# Patient Record
Sex: MALE | Race: WHITE | NOT HISPANIC OR LATINO | URBAN - METROPOLITAN AREA
[De-identification: names, ages, dates, MRNs, and addresses within clinical notes are randomized per-mention and may not be internally consistent; named-entity substitution may affect disease eponyms.]

---

## 2020-09-22 ENCOUNTER — OFFICE VISIT (OUTPATIENT)
Dept: URBAN - METROPOLITAN AREA CLINIC 121 | Facility: CLINIC | Age: 75
End: 2020-09-22

## 2021-04-23 ENCOUNTER — OFFICE VISIT (OUTPATIENT)
Dept: URBAN - METROPOLITAN AREA CLINIC 63 | Facility: CLINIC | Age: 76
End: 2021-04-23

## 2021-05-05 ENCOUNTER — OFFICE VISIT (OUTPATIENT)
Dept: URBAN - METROPOLITAN AREA CLINIC 63 | Facility: CLINIC | Age: 76
End: 2021-05-05

## 2021-07-01 ENCOUNTER — OFFICE VISIT (OUTPATIENT)
Dept: URBAN - METROPOLITAN AREA CLINIC 63 | Facility: CLINIC | Age: 76
End: 2021-07-01

## 2021-07-31 ENCOUNTER — LAB OUTSIDE AN ENCOUNTER (OUTPATIENT)
Dept: URBAN - METROPOLITAN AREA CLINIC 121 | Facility: CLINIC | Age: 76
End: 2021-07-31

## 2021-08-12 ENCOUNTER — OFFICE VISIT (OUTPATIENT)
Dept: URBAN - METROPOLITAN AREA CLINIC 63 | Facility: CLINIC | Age: 76
End: 2021-08-12

## 2021-08-19 ENCOUNTER — OFFICE VISIT (OUTPATIENT)
Dept: URBAN - METROPOLITAN AREA CLINIC 63 | Facility: CLINIC | Age: 76
End: 2021-08-19

## 2021-08-21 LAB
BANDS: (no result)
BASO (ABSOLUTE): (no result)
BASOS: (no result)
BLASTS/BLAST LIKE CELLS: (no result)
EOS (ABSOLUTE): (no result)
EOS: (no result)
FERRITIN, SERUM: (no result)
FOLATE (FOLIC ACID), SERUM: (no result)
HEMATOCRIT: (no result)
HEMATOLOGY COMMENTS:: (no result)
HEMOGLOBIN: (no result)
IMMATURE CELLS: (no result)
IMMATURE GRANS (ABS): (no result)
IMMATURE GRANULOCYTES: (no result)
IRON BIND.CAP.(TIBC): (no result)
IRON SATURATION: (no result)
IRON: (no result)
LYMPHS (ABSOLUTE): (no result)
LYMPHS: (no result)
MCH: (no result)
MCHC: (no result)
MCV: (no result)
MEGAKARYOCYTES: (no result)
METAMYELOCYTES: (no result)
MONOCYTES(ABSOLUTE): (no result)
MONOCYTES: (no result)
MYELOCYTES: (no result)
NEUTROPHILS (ABSOLUTE): (no result)
NEUTROPHILS: (no result)
NRBC: (no result)
OTHER, LINEAGE UNCERTAIN: (no result)
PLATELETS: (no result)
PROMYELOCYTES: (no result)
RBC: (no result)
RDW: (no result)
UIBC: (no result)
VITAMIN B12: (no result)
WBC: (no result)

## 2021-08-25 ENCOUNTER — OFFICE VISIT (OUTPATIENT)
Dept: URBAN - METROPOLITAN AREA CLINIC 63 | Facility: CLINIC | Age: 76
End: 2021-08-25

## 2022-07-09 ENCOUNTER — TELEPHONE ENCOUNTER (OUTPATIENT)
Dept: URBAN - METROPOLITAN AREA CLINIC 121 | Facility: CLINIC | Age: 77
End: 2022-07-09

## 2022-07-09 RX ORDER — DOXAZOSIN MESYLATE 4 MG/1
TABLET ORAL
Refills: 0 | OUTPATIENT
Start: 2021-04-12 | End: 2021-05-05

## 2022-07-09 RX ORDER — ROSUVASTATIN CALCIUM 20 MG/1
TABLET, FILM COATED ORAL
Refills: 0 | OUTPATIENT
Start: 2021-03-03 | End: 2021-05-05

## 2022-07-09 RX ORDER — HYDRALAZINE HYDROCHLORIDE 50 MG/1
TABLET ORAL
Refills: 0 | OUTPATIENT
Start: 2021-04-17 | End: 2021-05-05

## 2022-07-09 RX ORDER — FERROUS SULFATE 325(65) MG
TABLET ORAL TWICE A DAY
Refills: 0 | OUTPATIENT
Start: 2021-05-05 | End: 2021-08-25

## 2022-07-09 RX ORDER — ONDANSETRON 4 MG/1
TABLET, ORALLY DISINTEGRATING ORAL AS NEEDED
Refills: 0 | OUTPATIENT
Start: 2021-05-05 | End: 2021-08-25

## 2022-07-09 RX ORDER — DILTIAZEM HYDROCHLORIDE 180 MG/1
CAPSULE, EXTENDED RELEASE ORAL
Refills: 0 | OUTPATIENT
Start: 2021-03-09 | End: 2021-05-05

## 2022-07-09 RX ORDER — ALBUTEROL SULFATE 90 UG/1
INHALANT RESPIRATORY (INHALATION)
Refills: 0 | OUTPATIENT
Start: 2021-04-18 | End: 2021-05-05

## 2022-07-09 RX ORDER — INSULIN DETEMIR 100 [IU]/ML
INJECTION, SOLUTION SUBCUTANEOUS
Refills: 0 | OUTPATIENT
Start: 2021-03-03 | End: 2021-05-05

## 2022-07-09 RX ORDER — MOMETASONE 50 UG/1
SPRAY, METERED NASAL
Refills: 0 | OUTPATIENT
Start: 2021-02-11 | End: 2021-05-05

## 2022-07-09 RX ORDER — TORSEMIDE 10 MG/1
TABLET ORAL
Refills: 0 | OUTPATIENT
Start: 2021-04-12 | End: 2021-05-05

## 2022-07-09 RX ORDER — HYDRALAZINE HYDROCHLORIDE 10 MG/1
75 MG TABLET ORAL THREE TIMES A DAY
Refills: 0 | OUTPATIENT
Start: 2021-05-05 | End: 2021-08-25

## 2022-07-09 RX ORDER — AMLODIPINE BESYLATE 10 MG/1
TABLET ORAL
Refills: 0 | OUTPATIENT
Start: 2021-04-09 | End: 2021-05-05

## 2022-07-09 RX ORDER — LISINOPRIL 40 MG/1
TABLET ORAL
Refills: 0 | OUTPATIENT
Start: 2021-03-03 | End: 2021-05-05

## 2022-07-09 RX ORDER — INSULIN ASPART 100 [IU]/ML
INJECTION, SOLUTION INTRAVENOUS; SUBCUTANEOUS
Refills: 0 | OUTPATIENT
Start: 2021-03-19 | End: 2021-05-05

## 2022-07-09 RX ORDER — AMLODIPINE BESYLATE 5 MG/1
TABLET ORAL
Refills: 0 | OUTPATIENT
Start: 2021-03-03 | End: 2021-05-05

## 2022-07-09 RX ORDER — SITAGLIPTIN 50 MG/1
TABLET, FILM COATED ORAL ONCE A DAY
Refills: 0 | OUTPATIENT
Start: 2021-06-09 | End: 2021-08-25

## 2022-07-09 RX ORDER — CARVEDILOL 25 MG/1
TABLET, FILM COATED ORAL
Refills: 0 | OUTPATIENT
Start: 2021-04-06 | End: 2021-05-05

## 2022-07-09 RX ORDER — SYRINGE AND NEEDLE,INSULIN,1ML 25GX1"
SYRINGE, EMPTY DISPOSABLE MISCELLANEOUS
Refills: 0 | OUTPATIENT
Start: 2021-05-05 | End: 2021-08-25

## 2022-07-09 RX ORDER — DICLOFENAC SODIUM 30 MG/G
GEL TOPICAL AS NEEDED
Refills: 0 | OUTPATIENT
Start: 2021-05-05 | End: 2021-08-25

## 2022-07-09 RX ORDER — FLUTICASONE FUROATE AND VILANTEROL TRIFENATATE 100; 25 UG/1; UG/1
POWDER RESPIRATORY (INHALATION)
Refills: 0 | OUTPATIENT
Start: 2021-02-10 | End: 2021-05-05

## 2022-07-09 RX ORDER — OMEPRAZOLE 20 MG/1
CAPSULE, DELAYED RELEASE ORAL
Refills: 0 | OUTPATIENT
Start: 2021-04-01 | End: 2021-05-05

## 2022-07-09 RX ORDER — ASPIRIN 81 MG/1
TABLET, CHEWABLE ORAL
Refills: 0 | OUTPATIENT
Start: 2021-03-03 | End: 2021-05-05

## 2022-07-09 RX ORDER — LOSARTAN POTASSIUM 50 MG/1
TABLET, FILM COATED ORAL
Refills: 0 | OUTPATIENT
Start: 2021-02-02 | End: 2021-05-05

## 2022-07-09 RX ORDER — HYDRALAZINE HYDROCHLORIDE 50 MG/1
TABLET ORAL
Refills: 0 | OUTPATIENT
Start: 2021-03-03 | End: 2021-05-05

## 2022-07-09 RX ORDER — INSULIN DETEMIR 100 [IU]/ML
22 UNITS IN EVENING INJECTION, SOLUTION SUBCUTANEOUS
Refills: 0 | OUTPATIENT
Start: 2021-05-05 | End: 2021-07-01

## 2022-07-10 ENCOUNTER — TELEPHONE ENCOUNTER (OUTPATIENT)
Dept: URBAN - METROPOLITAN AREA CLINIC 121 | Facility: CLINIC | Age: 77
End: 2022-07-10

## 2022-07-10 RX ORDER — INSULIN ASPART 100 [IU]/ML
INJECTION, SOLUTION INTRAVENOUS; SUBCUTANEOUS
Refills: 0 | Status: ACTIVE | COMMUNITY
Start: 2021-05-05

## 2022-07-10 RX ORDER — FERROUS SULFATE 325(65) MG
TABLET ORAL TWICE A DAY
Refills: 0 | Status: ACTIVE | COMMUNITY
Start: 2021-08-25

## 2022-07-10 RX ORDER — LISINOPRIL 40 MG/1
TABLET ORAL ONCE A DAY
Refills: 0 | Status: ACTIVE | COMMUNITY
Start: 2021-05-05

## 2022-07-10 RX ORDER — AMLODIPINE BESYLATE 10 MG/1
TABLET ORAL ONCE A DAY
Refills: 0 | Status: ACTIVE | COMMUNITY
Start: 2021-05-05

## 2022-07-10 RX ORDER — SYRINGE AND NEEDLE,INSULIN,1ML 25GX1"
SYRINGE, EMPTY DISPOSABLE MISCELLANEOUS
Refills: 0 | Status: ACTIVE | COMMUNITY
Start: 2021-08-25

## 2022-07-10 RX ORDER — FLUTICASONE FUROATE AND VILANTEROL TRIFENATATE 100; 25 UG/1; UG/1
POWDER RESPIRATORY (INHALATION) ONCE A DAY
Refills: 0 | Status: ACTIVE | COMMUNITY
Start: 2021-05-05

## 2022-07-10 RX ORDER — OMEPRAZOLE 20 MG/1
CAPSULE, DELAYED RELEASE ORAL ONCE A DAY
Refills: 0 | Status: ACTIVE | COMMUNITY
Start: 2021-05-05

## 2022-07-10 RX ORDER — TORSEMIDE 10 MG/1
TABLET ORAL ONCE A DAY
Refills: 0 | Status: ACTIVE | COMMUNITY
Start: 2021-05-05

## 2022-07-10 RX ORDER — CARVEDILOL 25 MG/1
TABLET, FILM COATED ORAL TWICE A DAY
Refills: 0 | Status: ACTIVE | COMMUNITY
Start: 2021-05-05

## 2022-07-10 RX ORDER — DICLOFENAC SODIUM 30 MG/G
GEL TOPICAL AS NEEDED
Refills: 0 | Status: ACTIVE | COMMUNITY
Start: 2021-08-25

## 2022-07-10 RX ORDER — HYDRALAZINE HYDROCHLORIDE 10 MG/1
75 MG TABLET ORAL THREE TIMES A DAY
Refills: 0 | Status: ACTIVE | COMMUNITY
Start: 2021-08-25

## 2022-07-10 RX ORDER — ROSUVASTATIN CALCIUM 20 MG/1
TABLET, FILM COATED ORAL
Refills: 0 | Status: ACTIVE | COMMUNITY
Start: 2021-05-05

## 2022-07-10 RX ORDER — DOXAZOSIN MESYLATE 4 MG/1
TABLET ORAL
Refills: 0 | Status: ACTIVE | COMMUNITY
Start: 2021-05-05

## 2022-07-10 RX ORDER — ALBUTEROL SULFATE 90 UG/1
Q4 HR PRN INHALANT RESPIRATORY (INHALATION)
Refills: 0 | Status: ACTIVE | COMMUNITY
Start: 2021-05-05

## 2022-07-10 RX ORDER — SITAGLIPTIN 50 MG/1
TABLET, FILM COATED ORAL ONCE A DAY
Refills: 0 | Status: ACTIVE | COMMUNITY
Start: 2021-08-25

## 2022-07-10 RX ORDER — ONDANSETRON 4 MG/1
TABLET, ORALLY DISINTEGRATING ORAL AS NEEDED
Refills: 0 | Status: ACTIVE | COMMUNITY
Start: 2021-08-25

## 2022-09-26 ENCOUNTER — CLAIMS CREATED FROM THE CLAIM WINDOW (OUTPATIENT)
Dept: URBAN - METROPOLITAN AREA MEDICAL CENTER 14 | Facility: MEDICAL CENTER | Age: 77
End: 2022-09-26
Payer: MEDICARE

## 2022-09-26 DIAGNOSIS — R11.2 NAUSEA & VOMITING: ICD-10-CM

## 2022-09-26 DIAGNOSIS — R19.7 DIARRHEA: ICD-10-CM

## 2022-09-26 DIAGNOSIS — N18.30 CHRONIC KIDNEY DISEASE, STAGE 3 UNSPECIFIED: ICD-10-CM

## 2022-09-26 DIAGNOSIS — R93.89 ABNORMAL CT SCAN: ICD-10-CM

## 2022-09-26 PROCEDURE — 99223 1ST HOSP IP/OBS HIGH 75: CPT | Performed by: INTERNAL MEDICINE

## 2022-09-27 ENCOUNTER — CLAIMS CREATED FROM THE CLAIM WINDOW (OUTPATIENT)
Dept: URBAN - METROPOLITAN AREA MEDICAL CENTER 14 | Facility: MEDICAL CENTER | Age: 77
End: 2022-09-27
Payer: MEDICARE

## 2022-09-27 DIAGNOSIS — K52.9 COLITIS: ICD-10-CM

## 2022-09-27 DIAGNOSIS — R10.84 ABDOMINAL PAIN, GENERALIZED: ICD-10-CM

## 2022-09-27 DIAGNOSIS — R11.0 NAUSEA: ICD-10-CM

## 2022-09-27 PROCEDURE — 99232 SBSQ HOSP IP/OBS MODERATE 35: CPT | Performed by: NURSE PRACTITIONER

## 2022-10-01 ENCOUNTER — CLAIMS CREATED FROM THE CLAIM WINDOW (OUTPATIENT)
Dept: URBAN - METROPOLITAN AREA MEDICAL CENTER 14 | Facility: MEDICAL CENTER | Age: 77
End: 2022-10-01
Payer: MEDICARE

## 2022-10-01 DIAGNOSIS — K52.9 COLITIS: ICD-10-CM

## 2022-10-01 DIAGNOSIS — R10.9 ABDOMINAL PAIN: ICD-10-CM

## 2022-10-01 PROCEDURE — 99232 SBSQ HOSP IP/OBS MODERATE 35: CPT | Performed by: INTERNAL MEDICINE

## 2022-10-19 ENCOUNTER — DASHBOARD ENCOUNTERS (OUTPATIENT)
Age: 77
End: 2022-10-19

## 2022-10-19 ENCOUNTER — OFFICE VISIT (OUTPATIENT)
Dept: URBAN - METROPOLITAN AREA CLINIC 63 | Facility: CLINIC | Age: 77
End: 2022-10-19
Payer: MEDICARE

## 2022-10-19 ENCOUNTER — WEB ENCOUNTER (OUTPATIENT)
Dept: URBAN - METROPOLITAN AREA CLINIC 63 | Facility: CLINIC | Age: 77
End: 2022-10-19

## 2022-10-19 VITALS — WEIGHT: 228 LBS | HEART RATE: 55 BPM | TEMPERATURE: 96.7 F | HEIGHT: 70 IN | BODY MASS INDEX: 32.64 KG/M2

## 2022-10-19 DIAGNOSIS — A07.2 DIARRHEA DUE TO CRYPTOSPORIDIUM: ICD-10-CM

## 2022-10-19 PROCEDURE — 99214 OFFICE O/P EST MOD 30 MIN: CPT | Performed by: NURSE PRACTITIONER

## 2022-10-19 RX ORDER — NITAZOXANIDE 500 MG/1
1 TABLET WITH FOOD TABLET ORAL
Qty: 6 | OUTPATIENT
Start: 2022-10-19 | End: 2022-10-22

## 2022-10-19 RX ORDER — FERROUS SULFATE 325(65) MG
TABLET ORAL TWICE A DAY
Refills: 0 | Status: ACTIVE | COMMUNITY
Start: 2021-08-25

## 2022-10-19 RX ORDER — ESCITALOPRAM OXALATE 5 MG/1
1 TABLET TABLET ORAL ONCE A DAY
Status: ACTIVE | COMMUNITY

## 2022-10-19 RX ORDER — ALBUTEROL SULFATE 90 UG/1
Q4 HR PRN INHALANT RESPIRATORY (INHALATION)
Refills: 0 | Status: ACTIVE | COMMUNITY
Start: 2021-05-05

## 2022-10-19 RX ORDER — MOMETASONE FUROATE 50 UG/1
4 SPRAYS (2 SPRAYS IN EACH NOSTRIL) SPRAY, METERED NASAL ONCE A DAY
Status: ACTIVE | COMMUNITY

## 2022-10-19 RX ORDER — SITAGLIPTIN 50 MG/1
TABLET, FILM COATED ORAL ONCE A DAY
Refills: 0 | Status: ACTIVE | COMMUNITY
Start: 2021-08-25

## 2022-10-19 RX ORDER — ROSUVASTATIN CALCIUM 20 MG/1
TABLET, FILM COATED ORAL
Refills: 0 | Status: ACTIVE | COMMUNITY
Start: 2021-05-05

## 2022-10-19 RX ORDER — LOPERAMIDE HYDROCHLORIDE 2 MG/1
1 CAPSULE AS NEEDED CAPSULE ORAL
Status: ACTIVE | COMMUNITY

## 2022-10-19 RX ORDER — FLUTICASONE FUROATE AND VILANTEROL TRIFENATATE 100; 25 UG/1; UG/1
POWDER RESPIRATORY (INHALATION) ONCE A DAY
Refills: 0 | Status: ACTIVE | COMMUNITY
Start: 2021-05-05

## 2022-10-19 RX ORDER — TORSEMIDE 10 MG/1
TABLET ORAL ONCE A DAY
Refills: 0 | Status: ACTIVE | COMMUNITY
Start: 2021-05-05

## 2022-10-19 RX ORDER — DOXAZOSIN MESYLATE 4 MG/1
TABLET ORAL
Refills: 0 | Status: ACTIVE | COMMUNITY
Start: 2021-05-05

## 2022-10-19 RX ORDER — HYDRALAZINE HYDROCHLORIDE 10 MG/1
75 MG TABLET ORAL THREE TIMES A DAY
Refills: 0 | Status: ACTIVE | COMMUNITY
Start: 2021-08-25

## 2022-10-19 RX ORDER — CARVEDILOL 25 MG/1
TABLET, FILM COATED ORAL TWICE A DAY
Refills: 0 | Status: ACTIVE | COMMUNITY
Start: 2021-05-05

## 2022-10-19 RX ORDER — ONDANSETRON 4 MG/1
TABLET, ORALLY DISINTEGRATING ORAL AS NEEDED
Refills: 0 | Status: ACTIVE | COMMUNITY
Start: 2021-08-25

## 2022-10-19 RX ORDER — OMEPRAZOLE 20 MG/1
CAPSULE, DELAYED RELEASE ORAL ONCE A DAY
Refills: 0 | Status: ACTIVE | COMMUNITY
Start: 2021-05-05

## 2022-10-19 NOTE — HPI-TODAY'S VISIT:
Thank you very much for kindly referring Hiro Valdez Jr., very pleasant 77-year-old male, back to our service for hospital follow-up secondary to enteric colitis.  Past medical history significant for prostate cancer, HTN, HLD, DM2, COPD, pneumonia, CKD, pernicious anemia, allergic rhinitis, lumbago, morbid obesity, hiatal hernia and diverticulosis.  Past surgical history significant for colon resection (diverticulitis), cholecystectomy and appendectomy.  His last complete colonoscopy was over 4 April 2021 (see results below).  Hiro was recently discharged from [facility] on 03 October 2022 (see discharge summary below).  When he was hospitalized, he was found to be positive for Cryptosporidium and had been given IV Zosyn when an inpatient.  He presents today still complaining of diarrhea, averaging 3-7/day of watery stool and he states that he was, "discharged too soon".  He complains of fatigue, some fecal incontinence and black stool secondary to iron supplementation, but denies bright red blood per rectum, hematochezia, fever, or persistent abdominal pain.  He is otherwise asymptomatic from a GI perspective.  **********  As per [facility] discharge summary dated 03 October 2022 (Gary Vasquez MD): "Patient is a 77 y.o.  male who presents to [facility] emergency room with a chief complaint of nausea, vomiting, diarrhea associated with fever and productive cough. CXR has findings suggesting bilateral infrahilar bronchopneumonia or aspiration, right greater than left.Trace right pleural effusion. CT ABD/PELVIS without contrast has findings compatible with enterocolitis and diarrheal illness. Pneumonia or aspiration involving the visualized portions of the right middle and lower lobes.Trace right pleural effusion. CBC and CMP are significant for CHLORIDE 112, CO2 19, BUN 39 and CREAT 2.29.  RVP is negative. PPE was worn including protective gown, headcap, protective eye glasses, N95 mask, gloves and shoe covers during my examination.Pt denies  chills, chest pain, sob, abd pain, headache, blurry vision, double vision, dysuria, hematuria, syncope, LOC.  Hospital Course:   On the floor he was paced on IVF+HCO3.  Also placed on zosyn.   GI: Recommended a trial of clear liquids.  If pain, GI consider that patient might be having intermittent small bowel obstruction given the pattern.  KUB wnl.  +Crystosporidium (self limited disease).  Pt presented diarrhea.  Pt completed zosyn.  Creatinine started trending down.  Patient feels better, afebrile, no rigors and chills, good appetite, tolerating PO. Patient denies headache, double vision, speech difficulties, numbness, tingling, LOC, seizures, weakness and paralysis, he also denies chest pain, SOB, palpitations, cough, nausea, vomiting , abdominal pain, diarrhea and constipation.Patient denies rashes, dysuria, urinary retention, bladder and fecal incontinence."

## 2022-10-19 NOTE — HPI-PREVIOUS LABS
Lab work dated 03 October 2022 demonstrates the following abnormalities: RBC 2.42, hemoglobin 7.6, hematocrit 22.2%, immature granulocytes 1.50%, neutrophils 78.2%, lymphocytes 11.1%, absolute lymphocytes 0.7, chloride 113, CO2 17, BUN 23, creatinine 2.07, albumin 3.2, EGFR 32.4.  All remaining lab values of CBC and BMP are within normal limits. ********** Lab work dated 18 August 2021 demonstrates the following abnormalities: RBC 3.14, hemoglobin 9.8, hematocrit 29.7%, ferritin 1380, TIBC 151, UIBC 90, vitamin B12 1340.  All remaining lab values of anemia panel are within normal limits.  Full results listed below. ********** Lab work dated 19 May 2021 demonstrates the following abnormalities: RBC 2.84, hemoglobin 8.7, hematocrit 27.0%, sodium 149, chloride 118, CO2 18, BUN 37, creatinine 2.42, GFR non-African-American 25, HDL 30.  All remaining lab values of CBC, CMP, hemoglobin A1c, vitamin D, PSA and lipid panel are within normal limits.

## 2022-10-19 NOTE — PHYSICAL EXAM CONSTITUTIONAL:
well developed, well nourished , in no acute distress , ambulating without difficulty , normal communication ability
normal...

## 2022-10-19 NOTE — HPI-PREVIOUS PROCEDURES
EGD/03 April 2021 (Filiberto Valderrama MD).  Esophagus, stomach and duodenum were all normal with no evidence of bleeding.  Bile was noted in the stomach.  A small hiatal hernia was visualized. ********** Colonoscopy/04 April 2021 (Filiberto Valderrama MD).  In the rectosigmoid colon, there was evidence of a end-to-side colocolonic anastomosis which looked normal.  There is moderate diverticulosis in the sigmoid and descending colon.  In the cecum there was a 15 mm sessile polyp removed by hot snare polypectomy.  In the ascending colon there was a 15 mm sessile polyp which was removed by piecemeal hot snare.  There was a 5 mm polyp in the ascending colon.  By cold snare polypectomy.  In the descending colon there was a 12 mm sessile polyp removed by hot snare polypectomy.  In the rectum, there was a 6 mm sessile polyp removed by cold snare polypectomy.  Overall the prep was fair to poor, with some thick stool and stool debris throughout the colon.  There was severe ischemic colitis in the ascending colon, splenic flexure area.  This was evidenced by ulceration, ischemic changes.  Biopsies were taken from this area.  No active bleeding was noted throughout the colon.  In the rectum, on retroflexion small internal hemorrhoids were noted. PATHOLOGY: Cecal, ascending colon, descending colon and rectal polyp biopsies all demonstrate tubular adenomatous polyps, negative for high-grade dysplasia.  Descending colon biopsy demonstrates a subacute erosive colitis; dysplasia is absent.

## 2022-10-19 NOTE — HPI-PREVIOUS IMAGING
CT abdomen and pelvis without contrast/26 September 2022.  1.  Findings compatible with enterocolitis and diarrheal illness.  2.  Pneumonia or aspiration involving the visualized portions of the right middle and lower lobes.  Trace right pleural effusion. ********** CTA chest-aorta/30 March 2021.  1.  No aneurysms.  2.  No acute intracranial pathology.  3.  Mild emphysema with a tiny probably benign anterior right upper lobe lung nodule that may be present in 2019.

## 2022-12-23 PROBLEM — 129679001 COMPUTED TOMOGRAPHY RESULT ABNORMAL: Status: ACTIVE | Noted: 2022-12-23
